# Patient Record
(demographics unavailable — no encounter records)

---

## 2024-10-14 NOTE — HISTORY OF PRESENT ILLNESS
[Never] : never [TextBox_4] : 60-year-old male patient started  CPAP  more comfortable with nasal pillow pos  resolution daytime sleepiness and sxs Sleep evaluation Sleep study June 26 June 27, 2024 Severe obstructive sleep apnea AHI 43 Nonpositional 3% of time less than 90% on study room air Adi desaturation lowest 70.8% At office visit will address treatment protocol focusing primarily on CPAP No history of pulmonary disease No history of asthma pneumonia bronchitis pulmonary embolic disease interstitial lung disease Medications none Allergies none Past medical history significant symptom mitral valve prolapse asymptomatic  Non-smoker No history of chemical toxic inhalational exposures There is 2 siblings both with obstructive sleep apnea   [Obstructive Sleep Apnea] : obstructive sleep apnea [Awakes Unrefreshed] : does not awaken unrefreshed [Awakes with Dry Mouth] : awakes with dry mouth [Daytime Somnolence] : denies daytime somnolence [Snoring] : snoring [Tired while Driving] : tired while driving [Unintentional Sleep while Inactive] : unintentional sleep while inactive [TextBox_100] : 06/24 [TextBox_108] : 43 [TextBox_112] : 3 [TextBox_116] : 70.8

## 2024-10-14 NOTE — DISCUSSION/SUMMARY
[FreeTextEntry1] : Clinical improvement of daytime sleepiness tiredness driving Patient has had. no recurrence of migraine headache Patient compliant with CPAP therapy.  Continue present settings.  Patient with demonstrated clinical benefit with daytime and nocturnal symptomatology improvement.  Sleep study June 26 June 27, 2024 Severe obstructive sleep apnea AHI 43 Nonpositional 3% of time less than 90% on study room air Adi desaturation lowest 70.8% At office visit will address treatment protocol focusing primarily on CPAP AUTO 6-18 cm H20 Recommendations Pathophysiology of obstructive sleep apnea discussed Treatment protocols focusing primarily on CPAP addressed Recommendation for a formal 2 night home sleep study completed Office follow-up All questions answered

## 2024-10-14 NOTE — PHYSICAL EXAM
[No Acute Distress] : no acute distress [II] : Mallampati Class: II [Normal Appearance] : normal appearance [Supple] : supple [No Neck Mass] : no neck mass [No JVD] : no jvd [Normal Rate/Rhythm] : normal rate/rhythm [Normal S1, S2] : normal s1, s2 [No Murmurs] : no murmurs [No Rubs] : no rubs [No Gallops] : no gallops [No Resp Distress] : no resp distress [No Acc Muscle Use] : no acc muscle use [Normal Palpation] : normal palpation [Normal Rhythm and Effort] : normal rhythm and effort [Clear to Auscultation Bilaterally] : clear to auscultation bilaterally [No Abnormalities] : no abnormalities [Benign] : benign [Not Tender] : not tender [Soft] : soft [No HSM] : no hsm [Normal Bowel Sounds] : normal bowel sounds [Normal Gait] : normal gait [No Clubbing] : no clubbing [No Cyanosis] : no cyanosis [No Edema] : no edema [Normal Color/ Pigmentation] : normal color/ pigmentation [No Focal Deficits] : no focal deficits [Oriented x3] : oriented x3 [Normal Affect] : normal affect

## 2024-10-14 NOTE — PROCEDURE
[FreeTextEntry1] : CPAP data compliance thru 10/10/24 Usage 97% Hours greater than 6  AutoSet CPAP 6 to 18 cm H2O AHI2.0 Average pressures between 8 to 10 cm without much variation   Sleep study June 26 June 27, 2024 Severe obstructive sleep apnea AHI 43 Nonpositional 3% of time less than 90% on study room air Adi desaturation lowest 70.8%  Chest x-ray PA lateral Luzma 3, 2024 Clear lung fields No parenchymal trace pleural effusions dominant pulmonary nodules Soft tissue bony structures unremarkable Meaghan Unremarkable Overall impression clear lungs  End-tidal CO2 35 normal range Luzma 3, 2024  PFT no bronchodilator Luzma 3, 2021 Spirometry flow rates are normal range Lung labs are normal Diffusion normal Suggestion of some mild sawtooth pattern rule out obstructive sleep apnea

## 2025-01-06 NOTE — PROCEDURE
[FreeTextEntry1] : CPAP data compliance to January 2, 2025 Usage 100% Hours greater than 7 AutoSet CPAP 6-18 cm H2O AHI 1.6 No air leak   CPAP data compliance thru 10/10/24 Usage 97% Hours greater than 6  AutoSet CPAP 6 to 18 cm H2O AHI2.0 Average pressures between 8 to 10 cm without much variation   Sleep study June 26 June 27, 2024 Severe obstructive sleep apnea AHI 43 Nonpositional 3% of time less than 90% on study room air Adi desaturation lowest 70.8%  Chest x-ray PA lateral Luzma 3, 2024 Clear lung fields No parenchymal trace pleural effusions dominant pulmonary nodules Soft tissue bony structures unremarkable Meaghan Unremarkable Overall impression clear lungs  End-tidal CO2 35 normal range Luzma 3, 2024  PFT no bronchodilator Luzma 3, 2024 Spirometry flow rates are normal range Lung labs are normal Diffusion normal Suggestion of some mild sawtooth pattern rule out obstructive sleep apnea

## 2025-01-06 NOTE — DISCUSSION/SUMMARY
[FreeTextEntry1] : Clinical improvement of daytime sleepiness tiredness driving Patient has had. no recurrence of migraine headache Patient compliant with CPAP therapy.  Continue present settings.  Patient with demonstrated clinical benefit with daytime and nocturnal symptomatology improvement.  Sleep study June 26 June 27, 2024 Severe obstructive sleep apnea AHI 43 Nonpositional 3% of time less than 90% on study room air Dai desaturation lowest 70.8% At office visit will address treatment protocol focusing primarily on CPAP AUTO 6-18 cm H20 Recommendations Pathophysiology of obstructive sleep apnea discussed Treatment protocols focusing primarily on CPAP addressed Recommendation for a formal 2 night home sleep study completed Office follow-up All questions answered

## 2025-05-19 NOTE — PROCEDURE
[FreeTextEntry1] : CPAP data compliance to  5/18/25 Usage 100% Hours greater than 6 AutoSet CPAP 6-18 cm H2O AHI 14 minimal air leak  CPAP data compliance thru 10/10/24 Usage 97% Hours greater than 6  AutoSet CPAP 6 to 18 cm H2O AHI2.0 Average pressures between 8 to 10 cm without much variation   Sleep study June 26 June 27, 2024 Severe obstructive sleep apnea AHI 43 Nonpositional 3% of time less than 90% on study room air Adi desaturation lowest 70.8%  Chest x-ray PA lateral Luzma 3, 2024 Clear lung fields No parenchymal trace pleural effusions dominant pulmonary nodules Soft tissue bony structures unremarkable Meaghan Unremarkable Overall impression clear lungs  End-tidal CO2 35 normal range Luzma 3, 2024  PFT no bronchodilator Luzma 3, 2024 Spirometry flow rates are normal range Lung volumes are normal Diffusion normal Suggestion of some mild sawtooth pattern rule out obstructive sleep apnea